# Patient Record
Sex: MALE | Race: BLACK OR AFRICAN AMERICAN | NOT HISPANIC OR LATINO | Employment: FULL TIME | URBAN - METROPOLITAN AREA
[De-identification: names, ages, dates, MRNs, and addresses within clinical notes are randomized per-mention and may not be internally consistent; named-entity substitution may affect disease eponyms.]

---

## 2021-04-19 PROBLEM — J45.901 ASTHMA WITH ACUTE EXACERBATION: Status: ACTIVE | Noted: 2021-04-19

## 2021-04-19 PROBLEM — I10 ESSENTIAL HYPERTENSION: Status: ACTIVE | Noted: 2021-04-19

## 2021-07-23 ENCOUNTER — PATIENT OUTREACH (OUTPATIENT)
Dept: ADMINISTRATIVE | Facility: HOSPITAL | Age: 66
End: 2021-07-23

## 2021-10-15 ENCOUNTER — PATIENT OUTREACH (OUTPATIENT)
Dept: ADMINISTRATIVE | Facility: HOSPITAL | Age: 66
End: 2021-10-15

## 2022-09-15 PROBLEM — D12.3 BENIGN NEOPLASM OF TRANSVERSE COLON: Status: ACTIVE | Noted: 2022-09-15

## 2022-09-15 PROBLEM — Z86.010 HISTORY OF COLON POLYPS: Status: ACTIVE | Noted: 2022-09-15

## 2022-09-15 PROBLEM — Z80.0 FAMILY HISTORY OF COLON CANCER: Status: ACTIVE | Noted: 2022-09-15

## 2022-09-15 PROBLEM — Z12.11 SCREENING FOR MALIGNANT NEOPLASM OF COLON: Status: ACTIVE | Noted: 2022-09-15

## 2022-09-15 PROBLEM — D12.2 BENIGN NEOPLASM OF ASCENDING COLON: Status: ACTIVE | Noted: 2022-09-15

## 2022-09-15 LAB — CRC RECOMMENDATION EXT: NORMAL

## 2022-11-10 ENCOUNTER — PATIENT OUTREACH (OUTPATIENT)
Dept: ADMINISTRATIVE | Facility: OTHER | Age: 67
End: 2022-11-10

## 2022-11-10 NOTE — PROGRESS NOTES
CHW - Outreach Attempt    Community Health Worker left a voicemail message for 1st attempt to contact patient regarding: SDOH Initial screening  Community Health Worker to attempt to contact patient on: 11/10/2022

## 2022-11-16 ENCOUNTER — PATIENT OUTREACH (OUTPATIENT)
Dept: ADMINISTRATIVE | Facility: OTHER | Age: 67
End: 2022-11-16

## 2022-11-16 NOTE — PROGRESS NOTES
CHW - Initial Contact    This Community Health Worker completed the Social Determinant of Health questionnaire with patient via telephone today.    Pt identified barriers of most importance are: Patient identified no barriers of importance during phone interview.   Referrals to community agencies completed with patient consent outside of [facility] include: No referral was needed at time of phone interview today  Referrals were put through [facility] - no:   Support and Services: No support services were needed during phone interview today  Other information discussed the patient needs  help with: Patient discussed no other information during phone interview    Follow up required: no  No future outreach task assigned

## 2023-01-11 ENCOUNTER — PATIENT OUTREACH (OUTPATIENT)
Dept: ADMINISTRATIVE | Facility: HOSPITAL | Age: 68
End: 2023-01-11

## 2023-01-11 NOTE — PROGRESS NOTES
Population Health Outreach.Records Received, hyper-linked into chart at this time. The following record(s)  below were uploaded for Health Maintenance .    9/15/2022-colonoscopy

## 2023-06-08 ENCOUNTER — PATIENT OUTREACH (OUTPATIENT)
Dept: ADMINISTRATIVE | Facility: OTHER | Age: 68
End: 2023-06-08

## 2023-06-08 NOTE — PROGRESS NOTES
CHW - Follow Up    This Community Health Worker completed a follow up visit with patient during clinic visit today.  Pt reported: patient reported he is doing fine no assistance is needed during clinic visit.   Will reach out if assistance is needed in the future.   Community Health Worker provided: CHW spoke with patient he is doing fine.  Follow up required: No  No future outreach task assigned     CHW - Case Closure    This Community Health Worker spoke to patient during clinic visit today.   Pt reported:  patient reported he is doing fine no assistance is needed during clinic visit.   Will reach out if assistance is needed in the future.   Pt/ denied any additional needs at this time and agrees with episode closure at this time.  Provided patient with Community Health Worker's contact information and encouraged him to contact this Community Health Worker if additional needs arise.

## 2023-08-08 PROBLEM — J96.01 ACUTE HYPOXEMIC RESPIRATORY FAILURE: Status: ACTIVE | Noted: 2023-08-08

## 2023-08-08 PROBLEM — R09.02 HYPOXEMIA: Status: ACTIVE | Noted: 2023-08-08

## 2023-08-09 PROBLEM — J30.9 ALLERGIC RHINITIS DUE TO ALLERGEN: Status: ACTIVE | Noted: 2023-08-09

## 2023-08-09 PROBLEM — Z77.22 SECONDHAND SMOKE EXPOSURE: Status: ACTIVE | Noted: 2023-08-09

## 2023-11-13 PROBLEM — J96.01 ACUTE HYPOXEMIC RESPIRATORY FAILURE: Status: RESOLVED | Noted: 2023-08-08 | Resolved: 2023-11-13

## 2024-01-07 PROBLEM — E78.00 HYPERCHOLESTEREMIA: Status: ACTIVE | Noted: 2024-01-07

## 2024-01-07 PROBLEM — R05.4 COUGH SYNCOPE: Status: ACTIVE | Noted: 2024-01-07

## 2024-01-07 PROBLEM — R55 COUGH SYNCOPE: Status: ACTIVE | Noted: 2024-01-07

## 2024-01-07 PROBLEM — J45.909 CHRONIC ASTHMA WITHOUT COMPLICATION: Status: ACTIVE | Noted: 2021-04-19

## 2024-01-08 PROBLEM — J82.83 EOSINOPHILIC ASTHMA: Status: ACTIVE | Noted: 2021-04-19

## 2024-01-08 PROBLEM — J45.51 SEVERE PERSISTENT ASTHMA WITH EXACERBATION: Status: ACTIVE | Noted: 2024-01-08

## 2024-01-08 PROBLEM — E66.09 CLASS 1 OBESITY DUE TO EXCESS CALORIES WITH BODY MASS INDEX (BMI) OF 30.0 TO 30.9 IN ADULT: Status: ACTIVE | Noted: 2024-01-08

## 2024-01-12 ENCOUNTER — PATIENT OUTREACH (OUTPATIENT)
Dept: ADMINISTRATIVE | Facility: CLINIC | Age: 69
End: 2024-01-12

## 2024-01-12 NOTE — PROGRESS NOTES
C3 nurse spoke with Bimal Aceves  for a [facility] post hospital discharge follow up call. The patient has a scheduled HOS appointment with Leena Pan NP on 01/17/24 @ 2316.

## 2024-03-21 PROBLEM — D12.0 BENIGN NEOPLASM OF CECUM: Status: ACTIVE | Noted: 2024-03-21
